# Patient Record
Sex: FEMALE
[De-identification: names, ages, dates, MRNs, and addresses within clinical notes are randomized per-mention and may not be internally consistent; named-entity substitution may affect disease eponyms.]

---

## 2018-11-08 ENCOUNTER — HOSPITAL ENCOUNTER (OUTPATIENT)
Dept: HOSPITAL 15 - LAB | Age: 23
Discharge: HOME | End: 2018-11-08
Attending: OBSTETRICS & GYNECOLOGY
Payer: MEDICAID

## 2018-11-08 DIAGNOSIS — Z20.2: ICD-10-CM

## 2018-11-08 DIAGNOSIS — O99.810: Primary | ICD-10-CM

## 2018-11-08 DIAGNOSIS — Z3A.00: ICD-10-CM

## 2018-11-08 LAB
ALCOHOL, URINE: < 3 MG/DL (ref 0–5)
AMPHETAMINES UR QL SCN: NEGATIVE
BARBITURATES UR QL SCN: NEGATIVE
BENZODIAZ UR QL SCN: NEGATIVE
BZE UR QL SCN: NEGATIVE
CANNABINOIDS UR QL SCN: NEGATIVE
HCT VFR BLD AUTO: 36.7 % (ref 36–46)
HGB BLD-MCNC: 12 G/DL (ref 12.2–16.2)
MCH RBC QN AUTO: 30.9 PG (ref 28–32)
MCV RBC AUTO: 94.6 FL (ref 80–100)
NRBC BLD QL AUTO: 0 %
OPIATES UR QL SCN: NEGATIVE
PCP UR QL SCN: NEGATIVE

## 2018-11-08 PROCEDURE — 83036 HEMOGLOBIN GLYCOSYLATED A1C: CPT

## 2018-11-08 PROCEDURE — 87340 HEPATITIS B SURFACE AG IA: CPT

## 2018-11-08 PROCEDURE — 86703 HIV-1/HIV-2 1 RESULT ANTBDY: CPT

## 2018-11-08 PROCEDURE — 36415 COLL VENOUS BLD VENIPUNCTURE: CPT

## 2018-11-08 PROCEDURE — 86592 SYPHILIS TEST NON-TREP QUAL: CPT

## 2018-11-08 PROCEDURE — 86901 BLOOD TYPING SEROLOGIC RH(D): CPT

## 2018-11-08 PROCEDURE — 80307 DRUG TEST PRSMV CHEM ANLYZR: CPT

## 2018-11-08 PROCEDURE — 86850 RBC ANTIBODY SCREEN: CPT

## 2018-11-08 PROCEDURE — 86900 BLOOD TYPING SEROLOGIC ABO: CPT

## 2018-11-08 PROCEDURE — 86762 RUBELLA ANTIBODY: CPT

## 2018-11-08 PROCEDURE — 82951 GLUCOSE TOLERANCE TEST (GTT): CPT

## 2018-11-08 PROCEDURE — 87086 URINE CULTURE/COLONY COUNT: CPT

## 2018-11-08 PROCEDURE — 85025 COMPLETE CBC W/AUTO DIFF WBC: CPT

## 2019-01-22 ENCOUNTER — HOSPITAL ENCOUNTER (OUTPATIENT)
Dept: HOSPITAL 15 - LAB | Age: 24
Discharge: HOME | End: 2019-01-22
Attending: OBSTETRICS & GYNECOLOGY
Payer: MEDICAID

## 2019-01-22 DIAGNOSIS — O23.599: Primary | ICD-10-CM

## 2019-01-22 DIAGNOSIS — Z3A.00: ICD-10-CM

## 2019-01-22 LAB
HCT VFR BLD AUTO: 33.6 % (ref 36–46)
HGB BLD-MCNC: 11.6 G/DL (ref 12.2–16.2)
MCH RBC QN AUTO: 31.5 PG (ref 28–32)
MCV RBC AUTO: 91.7 FL (ref 80–100)
NRBC BLD QL AUTO: 0 %

## 2019-01-22 PROCEDURE — 36415 COLL VENOUS BLD VENIPUNCTURE: CPT

## 2019-01-22 PROCEDURE — 85025 COMPLETE CBC W/AUTO DIFF WBC: CPT

## 2019-01-22 PROCEDURE — 87081 CULTURE SCREEN ONLY: CPT

## 2021-07-06 ENCOUNTER — HOSPITAL ENCOUNTER (EMERGENCY)
Dept: HOSPITAL 15 - ER | Age: 26
LOS: 1 days | Discharge: HOME | End: 2021-07-07
Payer: MEDICAID

## 2021-07-06 VITALS — BODY MASS INDEX: 31.01 KG/M2 | HEIGHT: 63 IN | WEIGHT: 175 LBS

## 2021-07-06 VITALS — DIASTOLIC BLOOD PRESSURE: 84 MMHG | SYSTOLIC BLOOD PRESSURE: 130 MMHG

## 2021-07-06 DIAGNOSIS — R07.89: Primary | ICD-10-CM

## 2021-07-06 LAB
ALBUMIN SERPL-MCNC: 3.6 G/DL (ref 3.4–5)
ALP SERPL-CCNC: 86 U/L (ref 45–117)
ALT SERPL-CCNC: 27 U/L (ref 13–56)
ANION GAP SERPL CALCULATED.3IONS-SCNC: 5 MMOL/L (ref 5–15)
BILIRUB SERPL-MCNC: 0.6 MG/DL (ref 0.2–1)
BUN SERPL-MCNC: 10 MG/DL (ref 7–18)
BUN/CREAT SERPL: 12
CALCIUM SERPL-MCNC: 9.9 MG/DL (ref 8.5–10.1)
CHLORIDE SERPL-SCNC: 104 MMOL/L (ref 98–107)
CO2 SERPL-SCNC: 31 MMOL/L (ref 21–32)
GLUCOSE SERPL-MCNC: 97 MG/DL (ref 74–106)
HCT VFR BLD AUTO: 39.6 % (ref 36–46)
HGB BLD-MCNC: 13.8 G/DL (ref 12.2–16.2)
MCH RBC QN AUTO: 31.7 PG (ref 28–32)
MCV RBC AUTO: 91.1 FL (ref 80–100)
NRBC BLD QL AUTO: 0.1 %
POTASSIUM SERPL-SCNC: 3.8 MMOL/L (ref 3.5–5.1)
PROT SERPL-MCNC: 8 G/DL (ref 6.4–8.2)
SODIUM SERPL-SCNC: 140 MMOL/L (ref 136–145)

## 2021-07-06 PROCEDURE — 80053 COMPREHEN METABOLIC PANEL: CPT

## 2021-07-06 PROCEDURE — 81001 URINALYSIS AUTO W/SCOPE: CPT

## 2021-07-06 PROCEDURE — 85025 COMPLETE CBC W/AUTO DIFF WBC: CPT

## 2021-07-06 PROCEDURE — 84702 CHORIONIC GONADOTROPIN TEST: CPT

## 2021-07-06 PROCEDURE — 71045 X-RAY EXAM CHEST 1 VIEW: CPT

## 2021-07-06 PROCEDURE — 84443 ASSAY THYROID STIM HORMONE: CPT

## 2021-07-06 PROCEDURE — 36415 COLL VENOUS BLD VENIPUNCTURE: CPT

## 2021-07-06 PROCEDURE — 85049 AUTOMATED PLATELET COUNT: CPT

## 2021-07-06 PROCEDURE — 84484 ASSAY OF TROPONIN QUANT: CPT

## 2025-03-13 NOTE — ED.PDOC
HPI (NEURO)


HPI Comments


A 29 YEAR OLD FEMALE PRESENTS TO THE ED WITH CHIEF COMPLAINT OF HEADACHE. 

PATIENT REPORTS THAT SHE HAS BEEN EXPERIENCING A HEAD PRESSURE WITH ASSOCIATED 

RADIATION TO HER NECK FOR THE PAST 4 DAYS. PATIENT RELAYS THAT SHE ALSO HAS HAD 

SOME MILD CHEST PAINS. PATIENT STATES SHE HAS HISTORY OF MIGRAINES, HOWEVER, HER

PAIN IS NOT LIKE ANY MIGRAINE SHE HAS HAD BEFORE. PATIENT DENIES ANY DIZZINESS, 

BLURRED VISION, FEVER, N/V, OR SOB. NO OTHER SYMPTOMS REPORTED AT THIS TIME OF 

CARE.


Chief Complaint:  Headache


Time Seen by MD:  13:09


Reviewed Notes:  Nurses Notes, Medications, Allergies


Information Source:  Patient


Mode of Arrival:  Ambulatory


Severity:  Mild, Moderate


Headache Severity:  Moderate


Timing:  Days


Duration:  Since onset, Days


Prehospital treatment:  None


Headache Quality:  Other (PRESSURE)


Headache Location:  Generalized


Onset:  At rest


Circumstances:  Spontaneous


After:  Headache


History of:  None


Modifying factors:  Head position, Light


Associated Signs and Symptoms:  Headache





Past Medical History


Past Medical History (Other):  


MIGRAINE HEADACHE


Surgical History:  Denies all surgeries


GYN History:  No Pertinent GYN History





Social History


Smoker:  Non-Smoker


Alcohol:  Denies ETOH Use


Drugs:  Denies Drug Use


Lives In:  Home





Constitutional:  denies: chills, diaphoresis, fatigue, fever, malaise, sweats, 

weakness, others


EENTM:  denies: blurred vision, double vision, ear bleeding, ear discharge, ear 

drainage, ear pain, ear ringing, eye pain, eye redness, hearing loss, mouth 

pain, mouth swelling, nasal discharge, nose bleeding, nose congestion, nose 

pain, photophobia, tearing, throat pain, throat swelling, voice changes, others


Respiratory:  denies: cough, hemoptysis, orthopnea, SOB at rest, shortness of 

breath, SOB with excertion, stridor, wheezing, others


Cardiovascular:  denies: chest pain, dizzy spells, diaphoresis, Dyspnea on ex

ertion, edema, irregular heart beat, left arm pain, lightheadedness, 

palpitations, PND, syncope, others


Neurological:  reports: headache; denies: dizziness, fainting, left sided 

numbness, left sided weakness, numbness, paresthesia, pre-existing deficit, 

right sided numbness, right sided weakness, seizure, speech problems, tingling, 

tremors, weakness, others


Musculoskeletal:  reports: neck pain; denies: back pain, gout, joint pain, joint

swelling, muscle pain, muscle stiffness, others


Integumetry:  denies: bruises, change in color, change in hair/nails, dryness, 

laceration, lesions, lumps, rash, wounds, others


Allergic/Immunocompromised:  denies: Difficulty Healing, Frequent Infections, 

Hives, Itching, others


Hematologic/Lymphatic:  denies: anemia, blood clots, easy bleeding, easy 

bruising, swollen glands, others


Endocrine:  denies: excessive hunger, excessive sweating, excessive thirst, 

excessive urination, flushing, intolerance to cold, intolerance to heat, 

unexplained weight gain, unexplained weight loss, others


Psychiatric:  denies: anxiety, bipolar disorder, depression, hopeless, panic 

disorder, schizophrenia, sleepless, suicidal, others


All Other Systems:  Reviewed and Negative





Physical Exam


General Appearance:  No Apparent Distress, Normal


HEENT:  Normal ENT Inspection, PERRL/EOMI


Neck:  Full Range of Motion, Non-Tender, Normal, Normal Inspection


Respiratory:  Chest Non-Tender, Lungs Clear, No Accessory Muscle Use, No 

Respiratory Distress, Normal Breath Sounds


Cardiovascular:  No Edema, No JVD, No Murmur, No Gallop, Normal Peripheral 

Pulses, Regular Rate/Rhythm


Breast Exam:  Deferred


Gastrointestinal:  No Organomegaly, Non Tender, No Pulsatile Mass, Normal Bowel 

Sounds, Soft


Genitalia:  Deferred


Pelvic:  Deferred


Rectal:  Deferred


Extremities:  No calf tenderness, Normal capillary refill, Normal inspection, 

Normal range of motion, Non-tender, No pedal edema


Musculoskeletal :  


   Apperance:  Normal


Neurologic:  Alert, CNs II-XII nml as Tested, No Motor Deficits, Normal Affect, 

Normal Mood, No Sensory Deficits


Cerebellar Function:  Normal


Reflexes:  Normal


Skin:  Dry, Normal Color, Warm


Lymphatic:  No Adenopathy





Was a procedure done?


Was a procedure done?:  No





Differential Diagnosis (SZ)


Headache:  Cluster, Migraine, Intracerebral Hemorrhage, Mass Lesion, Sinusitis





X-Ray, Labs, Meds, VS





                                   Vital Signs








  Date Time  Temp Pulse Resp B/P (MAP) Pulse Ox O2 Delivery O2 Flow Rate FiO2


 


3/13/25 12:58 97.2 107 18 118/86 (97) 97   





 97.2       


 


3/13/25 12:58  107 18  97 Room Air  


 


3/13/25 12:21 97.5 107 18 118/86 (97) 97   





PATIENT: KRYSTAL FARLEYACCT: [id number]: [id number]


: 1995           LOC: ER                   ROOM / BED:  / 


AGE / SEX: 29 / F         ADM STATUS: REG ER        SERVICE DT: 25 1304





ORDERING PHYSICIAN: LEOBARDO ORR


PROCEDURE(s): HWOCT - HEAD WITHOUT CONTRAST


REASON: HEADACHE 


ORDER NUMBER(s): [id number], ACCESSION NUMBER(s): [id number]








EXAM: CT HEAD WITHOUT CONTRAST





HISTORY: HEADACHE





COMPARISON: None





TECHNIQUE: 





Axial images of the head were obtained and reformatted in coronal and sagittal 

planes.





All CT scans at this medical facility are performed using dose modulation techni

ques as appropriate to a performed exam including the following: Automated 

exposure control was utilized; adjustment of the MA and/or KV according to 

patient size; and use of iterative reconstruction technique. 





CT Dose: CTDI volume is 54 mGy. Dose-length product is a 63 mGy*cm 





FINDINGS: 





There is no evidence of acute intracranial hemorrhage, mass, mass effect midline

shift. There is no hydrocephalus or extra-axial fluid collection.  Gray-white 

matter differentiation is maintained.  





The visualized paranasal sinuses and mastoid air cells are clear.  The calvarium

is intact.





IMPRESSION: 





1. No acute intracranial process.





HS:Y  





Electronically Signed by: Dusty Lazaro at 2025 14:17:38 PM











DICTATED BY: DUSTY LAZARO MD


DICTATED DATE/TIME: 25





SIGNED BY: DUSTY LAZARO MD


SIGNED DATE/TIME: 25





CC: 





X-Ray, Labs, Meds, VS Comment


EXTERNAL MEDICAL RECORDS REVIEWED: 21 FOR CHEST PAIN


INDEPENDENT HISTORIANS: [NONE]


SOCIAL DETERMINANTS OF HEALTH: [NONE]





LABS ORDERED: NONE


REVIEWED AND INTERPRETED RESULTS: HEAD CT





IMAGING ORDERED: HEAD CT





TREATMENTS ORDERED: NONE





PROCEDURES PERFORMED: NONE





CRITICAL CARE TIME: NONE





I HAVE DISCUSSED THE PATIENT WITH THE ATTENDING PHYSICIAN DR. SHOOK AND HE 

AGREES WITH THE PATIENT'S PLAN OF CARE AND DISPOSITION.





BASED ON HISTORY OF PRESENT ILLNESS, AND PHYSICAL EXAM, PATIENT WILL BE 

DISCHARGED HOME. DISCUSSED PLAN FOR DISCHARGE HOME WITH RX. MEDICATION WARNINGS 

GIVEN. 





SHARED DECISION MAKING: DISCUSSED WITH PATIENT THAT THEIR WORKUP WAS NORMAL. 

PATIENT INSTRUCTED TO FOLLOW UP WITH PRIMARY CARE PROVIDER IN 1-2 DAYS FOR RE-

EVALUATION OF SYMPTOMS. PATIENT VERBALIZES UNDERSTANDING TO RETURN TO ED FOR NEW

 OR WORSENING SYMPTOMS OR IF FOLLOW UP WITH PCP CANNOT BE OBTAINED. PATIENT 

FEELS COMFORTABLE GOING HOME AT THIS TIME. ALL QUESTIONS ADDRESSED AT TIME OF 

DISCHARGE.


Time of 1ST Reevaluation:  14:29


Reevaluation 1ST:  Improved


Patient Education/Counseling:  Diagnosis, Treatment, Need For Follow Up


Family Education/Counseling:  Diagnosis, Treatment, Need For Follow Up, No 

Family Present


Medical Screening:  No EMC Exist At This Time





Departure 1


Departure


Time of Disposition:  14:30


Impression:  


   Primary Impression:  


   Migraine headache


   Qualified Codes:  G43.909 - Migraine, unspecified, not intractable, without 

   status migrainosus


Disposition:  01 HOME / SELF CARE / HOMELESS


Condition:  Stable





Additional Instructions:  


FOLLOW-UP WITH PCP IN 1 TO 2 DAYS.  TAKE MEDICATIONS AS PRESCRIBED.  RETURN TO 

ED FOR ANY NEW OR WORSENING SYMPTOMS.


e-Prescriptions


Sumatriptan Succinate (Imitrex) 50 Mg Tab


50 MG PO BID, #20 TAB


   Prov: LEOBARDO ORR         3/13/25


Discharged With:  Self





Critical Care Note


Critical Care Time?:  No





Stability


Stability form required:  No





Heart Score


Heart Score:  








Heart Score Response (Comments) Value


 


History N/A 0


 


EKG N/A 0


 


Age N/A 0


 


Risk Factors N/A 0


 


Troponin N/A 0


 


Total  0














I personally scribed for LEOBARDO ORR ([email or web address]) on 3/13/25 at 13:13.  

Electronically submitted by Favian Matson ([email or web address]).





LEOBARDO ORR                 Mar 13, 2025 13:13

## 2025-03-13 NOTE — DVH
EXAM: CT HEAD WITHOUT CONTRAST



HISTORY: HEADACHE



COMPARISON: None



TECHNIQUE: 



Axial images of the head were obtained and reformatted in coronal and sagittal planes.



All CT scans at this medical facility are performed using dose modulation techniques as appropriate t
o a performed exam including the following: Automated exposure control was utilized; adjustment of th
e MA and/or KV according to patient size; and use of iterative reconstruction technique. 



CT Dose: CTDI volume is 54 mGy. Dose-length product is a 63 mGy*cm 



FINDINGS: 



There is no evidence of acute intracranial hemorrhage, mass, mass effect midline shift. There is no h
ydrocephalus or extra-axial fluid collection.  Gray-white matter differentiation is maintained.  



The visualized paranasal sinuses and mastoid air cells are clear.  The calvarium is intact.



IMPRESSION: 



1. No acute intracranial process.



HS:Y  



Electronically Signed by: Dusty Lazaro at 03/13/2025 14:17:38 PM